# Patient Record
Sex: MALE | Race: WHITE | Employment: FULL TIME | URBAN - METROPOLITAN AREA
[De-identification: names, ages, dates, MRNs, and addresses within clinical notes are randomized per-mention and may not be internally consistent; named-entity substitution may affect disease eponyms.]

---

## 2023-01-03 ENCOUNTER — APPOINTMENT (OUTPATIENT)
Dept: GENERAL RADIOLOGY | Age: 43
End: 2023-01-03
Payer: COMMERCIAL

## 2023-01-03 ENCOUNTER — HOSPITAL ENCOUNTER (EMERGENCY)
Age: 43
Discharge: HOME | End: 2023-01-04
Attending: EMERGENCY MEDICINE
Payer: COMMERCIAL

## 2023-01-03 DIAGNOSIS — D72.829 LEUKOCYTOSIS, UNSPECIFIED TYPE: ICD-10-CM

## 2023-01-03 DIAGNOSIS — R07.9 CHEST PAIN, UNSPECIFIED TYPE: Primary | ICD-10-CM

## 2023-01-03 LAB
ALBUMIN SERPL-MCNC: 4.3 G/DL (ref 3.5–5.2)
ALP BLD-CCNC: 113 U/L (ref 40–129)
ALT SERPL-CCNC: 33 U/L (ref 0–40)
ANION GAP SERPL CALCULATED.3IONS-SCNC: 11 MMOL/L (ref 7–16)
AST SERPL-CCNC: 23 U/L (ref 0–39)
BASOPHILS ABSOLUTE: 0.07 E9/L (ref 0–0.2)
BASOPHILS RELATIVE PERCENT: 0.5 % (ref 0–2)
BILIRUB SERPL-MCNC: 0.4 MG/DL (ref 0–1.2)
BUN BLDV-MCNC: 14 MG/DL (ref 6–20)
CALCIUM SERPL-MCNC: 9.3 MG/DL (ref 8.6–10.2)
CHLORIDE BLD-SCNC: 104 MMOL/L (ref 98–107)
CO2: 24 MMOL/L (ref 22–29)
CREAT SERPL-MCNC: 1 MG/DL (ref 0.7–1.2)
EOSINOPHILS ABSOLUTE: 0.29 E9/L (ref 0.05–0.5)
EOSINOPHILS RELATIVE PERCENT: 1.9 % (ref 0–6)
GFR SERPL CREATININE-BSD FRML MDRD: >60 ML/MIN/1.73
GLUCOSE BLD-MCNC: 95 MG/DL (ref 74–99)
HCT VFR BLD CALC: 48.1 % (ref 37–54)
HEMOGLOBIN: 16 G/DL (ref 12.5–16.5)
IMMATURE GRANULOCYTES #: 0.07 E9/L
IMMATURE GRANULOCYTES %: 0.5 % (ref 0–5)
LIPASE: 28 U/L (ref 13–60)
LYMPHOCYTES ABSOLUTE: 3.13 E9/L (ref 1.5–4)
LYMPHOCYTES RELATIVE PERCENT: 20.4 % (ref 20–42)
MCH RBC QN AUTO: 27.5 PG (ref 26–35)
MCHC RBC AUTO-ENTMCNC: 33.3 % (ref 32–34.5)
MCV RBC AUTO: 82.6 FL (ref 80–99.9)
MONOCYTES ABSOLUTE: 1 E9/L (ref 0.1–0.95)
MONOCYTES RELATIVE PERCENT: 6.5 % (ref 2–12)
NEUTROPHILS ABSOLUTE: 10.79 E9/L (ref 1.8–7.3)
NEUTROPHILS RELATIVE PERCENT: 70.2 % (ref 43–80)
PDW BLD-RTO: 12.7 FL (ref 11.5–15)
PLATELET # BLD: 266 E9/L (ref 130–450)
PMV BLD AUTO: 9.8 FL (ref 7–12)
POTASSIUM SERPL-SCNC: 4.5 MMOL/L (ref 3.5–5)
RBC # BLD: 5.82 E12/L (ref 3.8–5.8)
SODIUM BLD-SCNC: 139 MMOL/L (ref 132–146)
TOTAL PROTEIN: 7.3 G/DL (ref 6.4–8.3)
TROPONIN, HIGH SENSITIVITY: 7 NG/L (ref 0–11)
WBC # BLD: 15.4 E9/L (ref 4.5–11.5)

## 2023-01-03 PROCEDURE — 85025 COMPLETE CBC W/AUTO DIFF WBC: CPT

## 2023-01-03 PROCEDURE — 93005 ELECTROCARDIOGRAM TRACING: CPT | Performed by: NURSE PRACTITIONER

## 2023-01-03 PROCEDURE — 71046 X-RAY EXAM CHEST 2 VIEWS: CPT

## 2023-01-03 PROCEDURE — 84484 ASSAY OF TROPONIN QUANT: CPT

## 2023-01-03 PROCEDURE — 80053 COMPREHEN METABOLIC PANEL: CPT

## 2023-01-03 PROCEDURE — 83690 ASSAY OF LIPASE: CPT

## 2023-01-03 PROCEDURE — 36415 COLL VENOUS BLD VENIPUNCTURE: CPT

## 2023-01-03 PROCEDURE — 99285 EMERGENCY DEPT VISIT HI MDM: CPT

## 2023-01-03 ASSESSMENT — PAIN DESCRIPTION - DESCRIPTORS: DESCRIPTORS: PRESSURE

## 2023-01-03 ASSESSMENT — LIFESTYLE VARIABLES: HOW MANY STANDARD DRINKS CONTAINING ALCOHOL DO YOU HAVE ON A TYPICAL DAY: PATIENT DOES NOT DRINK

## 2023-01-03 ASSESSMENT — PAIN - FUNCTIONAL ASSESSMENT: PAIN_FUNCTIONAL_ASSESSMENT: 0-10

## 2023-01-03 ASSESSMENT — PAIN DESCRIPTION - LOCATION: LOCATION: CHEST

## 2023-01-03 NOTE — Clinical Note
Gladis Dotson was seen and treated in our emergency department on 1/3/2023. He may return to work on 01/06/2023. If you have any questions or concerns, please don't hesitate to call.       Silva Layton, DO

## 2023-01-03 NOTE — LETTER
[address] [facility] Emergency Department  [address] [place] [address]  Jessica Baig [address]  Phone: [phone]               January 4, 2023    Patient: Danuta Etienne   YOB: 1980   Date of Visit: 1/3/2023       To Whom It May Concern:    Marily Sepulveda was seen and treated in our emergency department on 03/03/2023. He can return to work on 01/05/2023.       Sincerely,       Ramonita Reza RN BSN CCRN [facility]        Signature:__________________________________

## 2023-01-04 VITALS
OXYGEN SATURATION: 100 % | SYSTOLIC BLOOD PRESSURE: 131 MMHG | BODY MASS INDEX: 38.5 KG/M2 | HEART RATE: 84 BPM | WEIGHT: 300 LBS | TEMPERATURE: 98.4 F | RESPIRATION RATE: 15 BRPM | DIASTOLIC BLOOD PRESSURE: 78 MMHG | HEIGHT: 74 IN

## 2023-01-04 LAB
EKG ATRIAL RATE: 75 BPM
EKG P AXIS: 26 DEGREES
EKG P-R INTERVAL: 158 MS
EKG Q-T INTERVAL: 386 MS
EKG QRS DURATION: 82 MS
EKG QTC CALCULATION (BAZETT): 431 MS
EKG R AXIS: -6 DEGREES
EKG T AXIS: 15 DEGREES
EKG VENTRICULAR RATE: 75 BPM

## 2023-01-04 PROCEDURE — 6360000002 HC RX W HCPCS

## 2023-01-04 PROCEDURE — 96372 THER/PROPH/DIAG INJ SC/IM: CPT

## 2023-01-04 PROCEDURE — 6370000000 HC RX 637 (ALT 250 FOR IP)

## 2023-01-04 PROCEDURE — 93010 ELECTROCARDIOGRAM REPORT: CPT | Performed by: INTERNAL MEDICINE

## 2023-01-04 RX ORDER — SUCRALFATE 1 G/1
1 TABLET ORAL 4 TIMES DAILY
Qty: 120 TABLET | Refills: 3 | Status: SHIPPED | OUTPATIENT
Start: 2023-01-04

## 2023-01-04 RX ORDER — FAMOTIDINE 20 MG/1
20 TABLET, FILM COATED ORAL 2 TIMES DAILY
Qty: 60 TABLET | Refills: 0 | Status: SHIPPED | OUTPATIENT
Start: 2023-01-04

## 2023-01-04 RX ORDER — KETOROLAC TROMETHAMINE 15 MG/ML
15 INJECTION, SOLUTION INTRAMUSCULAR; INTRAVENOUS ONCE
Status: COMPLETED | OUTPATIENT
Start: 2023-01-04 | End: 2023-01-04

## 2023-01-04 RX ADMIN — KETOROLAC TROMETHAMINE 15 MG: 15 INJECTION, SOLUTION INTRAMUSCULAR; INTRAVENOUS at 01:59

## 2023-01-04 RX ADMIN — LIDOCAINE HYDROCHLORIDE: 20 SOLUTION ORAL; TOPICAL at 00:11

## 2023-01-04 ASSESSMENT — ENCOUNTER SYMPTOMS
VOICE CHANGE: 0
ABDOMINAL PAIN: 0
SHORTNESS OF BREATH: 0
VOMITING: 0
COUGH: 0
PHOTOPHOBIA: 0
DIARRHEA: 0
BACK PAIN: 0
WHEEZING: 0
TROUBLE SWALLOWING: 0
NAUSEA: 0

## 2023-01-04 NOTE — ED NOTES
To [facility] then to [address] [place] [address] to wait for results of tests.       Jose Ranks  01/03/23 2053       Jose Ranks  01/03/23 2054

## 2023-01-04 NOTE — ED PROVIDER NOTES
43y.o. year old male presenting to the emergency room with concerns of chest pain. Reports that symptom's onset 4 days prior. Worsen with none. Improves with burping. Severity of 0/10, with no radiation . Symptoms are constant in timing. Symptoms described as dullness. associated symptoms of burping. Patient in  no acute distress. Patient denies any previous history of hyperlipidemia, hypertension, family history of heart attacks before the age of 54. Patient reports that yearly checkups through his job, however has not seen PCP in some time. Chief Complaint   Patient presents with    Chest Pain     Dul pressure in chest anxiety/ diff swallowing       Review of Systems   Constitutional:  Negative for chills, fatigue and fever. HENT:  Negative for trouble swallowing and voice change. Eyes:  Negative for photophobia and visual disturbance. Respiratory:  Negative for cough, shortness of breath and wheezing. Cardiovascular:  Positive for chest pain. Gastrointestinal:  Negative for abdominal pain, diarrhea, nausea and vomiting. Musculoskeletal:  Negative for arthralgias, back pain, neck pain and neck stiffness. Skin:  Negative for rash and wound. Neurological:  Negative for dizziness, syncope and headaches. Psychiatric/Behavioral:  Negative for behavioral problems and confusion. The patient is nervous/anxious. Physical Exam  Vitals reviewed. Constitutional:       General: He is not in acute distress. Appearance: Normal appearance. HENT:      Head: Normocephalic. Right Ear: External ear normal.      Left Ear: External ear normal.      Nose: Nose normal.      Mouth/Throat:      Mouth: Mucous membranes are moist.   Eyes:      General:         Right eye: No discharge. Left eye: No discharge. Conjunctiva/sclera: Conjunctivae normal.   Cardiovascular:      Rate and Rhythm: Normal rate and regular rhythm. Heart sounds: No friction rub. No gallop.    Pulmonary: Effort: Pulmonary effort is normal. No respiratory distress. Breath sounds: No stridor. No rhonchi or rales. Abdominal:      General: There is no distension. Tenderness: There is no abdominal tenderness. There is no guarding or rebound. Musculoskeletal:         General: No tenderness or deformity. Cervical back: Normal range of motion and neck supple. No rigidity or tenderness. Skin:     General: Skin is warm. Coloration: Skin is not jaundiced. Findings: No erythema. Neurological:      Mental Status: He is alert and oriented to person, place, and time. Sensory: No sensory deficit. Motor: No weakness. Psychiatric:         Mood and Affect: Mood is anxious. Behavior: Behavior normal.        Procedures     XR CHEST (2 VW)   Final Result   No acute cardiopulmonary process. EKG:  This EKG is signed by emergency department physician. Rate: 75  Rhythm: Sinus  AXIS:left    Interpretation: sinus arrhythmia  Comparison: no previous EKG available     MDM  Number of Diagnoses or Management Options  Chest pain, unspecified type  Leukocytosis, unspecified type  Diagnosis management comments: 70-year-old male presented emerged part complaints of chest pain. Lab work as interpreted by medical resident lipase negative no electrolyte abnormality with normal kidney and liver function. Troponin negative. Mild white count elevation. Imaging as interpreted by medical resident. No obvious opacity or pneumothorax noted. Chest x-ray with no acute process noted. Patient reporting pain more discomfort. Previous history of hypertension hyperlipidemia. Reports nearly checkups with work however has not seen PCP for over 2 years. Patient given GI cocktail. Improvement of symptomatology, mild discomfort. Given dose of Toradol IV.   Spoke to patient, discussed today's results through shared decision making we will plan to discharge patient at time with follow-up with PCP.  Patient would like to reestablish with previous PCP and will follow up. Rx for oral Pepcid, oral Carafate given. Return instructions given. ED Course as of 01/04/23 0358 Wed Jan 04, 2023 0154 Spoke to patient, discussed today's results and through shared decision making we will plan to discharge patient at this time with follow-up with PCP. Patient would like to reestablish with previous family doctor. Given return instructions. Rx for oral Carafate, Pepcid given. [SACHI]      ED Course User Index  [SACHI] Dusty Arias DO        ED Course as of 01/04/23 0358 Wed Jan 04, 2023 0154 Spoke to patient, discussed today's results and through shared decision making we will plan to discharge patient at this time with follow-up with PCP. Patient would like to reestablish with previous family doctor. Given return instructions. Rx for oral Carafate, Pepcid given. [SACHI]      ED Course User Index  [SACHI] Dusty Arias DO       --------------------------------------------- PAST HISTORY ---------------------------------------------  Past Medical History:  has no past medical history on file. Past Surgical History:  has no past surgical history on file. Social History:  reports that he has never smoked. He does not have any smokeless tobacco history on file. He reports that he does not drink alcohol and does not use drugs. Family History: family history is not on file. The patients home medications have been reviewed. Allergies: Patient has no known allergies.     -------------------------------------------------- RESULTS -------------------------------------------------  Labs:  Results for orders placed or performed during the hospital encounter of 01/03/23   Troponin   Result Value Ref Range    Troponin, High Sensitivity 7 0 - 11 ng/L   CBC with Auto Differential   Result Value Ref Range    WBC 15.4 (H) 4.5 - 11.5 E9/L    RBC 5.82 (H) 3.80 - 5.80 E12/L    Hemoglobin 16.0 12.5 - 16.5 g/dL Hematocrit 48.1 37.0 - 54.0 %    MCV 82.6 80.0 - 99.9 fL    MCH 27.5 26.0 - 35.0 pg    MCHC 33.3 32.0 - 34.5 %    RDW 12.7 11.5 - 15.0 fL    Platelets 921 708 - 620 E9/L    MPV 9.8 7.0 - 12.0 fL    Neutrophils % 70.2 43.0 - 80.0 %    Immature Granulocytes % 0.5 0.0 - 5.0 %    Lymphocytes % 20.4 20.0 - 42.0 %    Monocytes % 6.5 2.0 - 12.0 %    Eosinophils % 1.9 0.0 - 6.0 %    Basophils % 0.5 0.0 - 2.0 %    Neutrophils Absolute 10.79 (H) 1.80 - 7.30 E9/L    Immature Granulocytes # 0.07 E9/L    Lymphocytes Absolute 3.13 1.50 - 4.00 E9/L    Monocytes Absolute 1.00 (H) 0.10 - 0.95 E9/L    Eosinophils Absolute 0.29 0.05 - 0.50 E9/L    Basophils Absolute 0.07 0.00 - 0.20 E9/L   Comprehensive Metabolic Panel   Result Value Ref Range    Sodium 139 132 - 146 mmol/L    Potassium 4.5 3.5 - 5.0 mmol/L    Chloride 104 98 - 107 mmol/L    CO2 24 22 - 29 mmol/L    Anion Gap 11 7 - 16 mmol/L    Glucose 95 74 - 99 mg/dL    BUN 14 6 - 20 mg/dL    Creatinine 1.0 0.7 - 1.2 mg/dL    Est, Glom Filt Rate >60 >=60 mL/min/1.73    Calcium 9.3 8.6 - 10.2 mg/dL    Total Protein 7.3 6.4 - 8.3 g/dL    Albumin 4.3 3.5 - 5.2 g/dL    Total Bilirubin 0.4 0.0 - 1.2 mg/dL    Alkaline Phosphatase 113 40 - 129 U/L    ALT 33 0 - 40 U/L    AST 23 0 - 39 U/L   Lipase   Result Value Ref Range    Lipase 28 13 - 60 U/L   EKG 12 Lead   Result Value Ref Range    Ventricular Rate 75 BPM    Atrial Rate 75 BPM    P-R Interval 158 ms    QRS Duration 82 ms    Q-T Interval 386 ms    QTc Calculation (Bazett) 431 ms    P Axis 26 degrees    R Axis -6 degrees    T Axis 15 degrees       Radiology:  XR CHEST (2 VW)   Final Result   No acute cardiopulmonary process. ------------------------- NURSING NOTES AND VITALS REVIEWED ---------------------------  Date / Time Roomed:  1/3/2023 11:44 PM  ED Bed Assignment:  SONIA/SONIA    The nursing notes within the ED encounter and vital signs as below have been reviewed.    /78   Pulse 84   Temp 98.4 °F (36.9 °C) Resp 15   Ht 6' 2\" (1.88 m)   Wt 300 lb (136.1 kg)   SpO2 100%   BMI 38.52 kg/m²   Oxygen Saturation Interpretation: Normal      ------------------------------------------ PROGRESS NOTES ------------------------------------------  I have spoken with the patient and discussed todays results, in addition to providing specific details for the plan of care and counseling regarding the diagnosis and prognosis. Their questions are answered at this time and they are agreeable with the plan. I discussed at length with them reasons for immediate return here for re evaluation. They will followup with primary care by calling their office tomorrow. --------------------------------- ADDITIONAL PROVIDER NOTES ---------------------------------  At this time the patient is without objective evidence of an acute process requiring hospitalization or inpatient management. They have remained hemodynamically stable throughout their entire ED visit and are stable for discharge with outpatient follow-up. The plan has been discussed in detail and they are aware of the specific conditions for emergent return, as well as the importance of follow-up. Discharge Medication List as of 1/4/2023  2:05 AM        START taking these medications    Details   famotidine (PEPCID) 20 MG tablet Take 1 tablet by mouth 2 times daily, Disp-60 tablet, R-0Normal      sucralfate (CARAFATE) 1 GM tablet Take 1 tablet by mouth 4 times daily, Disp-120 tablet, R-3Normal             Diagnosis:  1. Chest pain, unspecified type    2. Leukocytosis, unspecified type        Disposition:  Patient's disposition: Discharge to home  Patient's condition is stable. Attending was present and available throughout encounter including all critical portions;  See Attending Note/Attestation for Final Plan       Margaret Burnett DO  Resident  01/04/23 0753   ATTENDING PROVIDER ATTESTATION:     I,  Dr. Leotha Jurist am the primary physician of record for this patient. Sanya Bell presented to the emergency department for evaluation of Chest Pain (Dul pressure in chest anxiety/ diff swallowing)   and was initially evaluated by the Medical Resident. See Original ED Note for H&P and ED course above. I have reviewed and discussed the case, including pertinent history (medical, surgical, family and social) and exam findings with the Medical Resident assigned to Sanya Bell. I have personally performed and/or participated in the history, exam, medical decision making, and procedures and agree with all pertinent clinical information. If an EKG was performed I did review the EKG and did discuss the interpretation with the resident. I do agree with the residents interpretation. I, Dr. Sabrina Patiño am the primary physician of record. Sanya Bell is a 43 y.o. male who presents to the ED for chest pain  Vital signs upon arrival /78   Pulse 84   Temp 98.4 °F (36.9 °C)   Resp 15   Ht 6' 2\" (1.88 m)   Wt 300 lb (136.1 kg)   SpO2 100%   BMI 38.52 kg/m²     History From: The patient as well as the patient's medical record    Independent historian: None    Limitations to history : None    History: The patient is a 29-year-old male presenting emergency department the chief plaint of chest pain. Patient began with chest pain 4 days ago. He has not tried any treatments. Described as dull. He did have some improvement with burping. Physical exam: Reveals patient sitting in the bed in no acute distress. Lungs clear to auscultation bilaterally.   Heart regular rate and rhythm    Differential diagnosis includes but not limited to:  MI-no evidence of ischemic changes on EKG  ACS-unlikely as patient has heart score of 1 for father with MI  Pneumothorax-unremarkable chest x-ray  Pulmonary embolus-unlikely as patient is PERC negative  Pancreatitis-nightly as patient with lipase of 28  Gastritis-likely as patient did have improvement with GI cocktail and does have improvement with burping    Records Reviewed : None    Diagnostic Considerations : Considered CTA of the chest but patient is PERC negative making a CTA of the chest unnecessary. Patient treated with:  P.o. GI cocktail  IM Toradol    Labs reviewed by me demonstrate :  CBC remarkable for mild leukocytosis of 15.4, no obvious source of infection  CMP unremarkable  High-sensitivity troponin 7  Lipase 28    Imaging reviewed and interpreted by me demonstrates:  Chest x-ray with no evidence of pneumothorax or pneumonia    EKG: This EKG is signed and interpreted by me. Normal sinus rhythm rate of 75, no ST segment elevation or depression, ME interval 158 MS, QRS 82 MS,  MS      Discussion with Other Profesionals : None    Social Determinants : None      Disposition:     Pt will be d/c with prescriptions for Pepcid and Carafate and will follow up with his PCP . He is educated on signs and symptoms that require emergent evaluation. Pt is advised to return to the ED if his symptoms change or worsen. If his pain persists, pt may need further evaluation. Pt is agreeable to plan and all questions have been answered at this time.         PATIENT REFERRED TO:  Follow up with your doctor or call the [facility] for a family doctor  Call [phone]  Call       [address] [facility] Emergency Department  [address] [facility] [address] [phone]    If symptoms worsen      DISCHARGE MEDICATIONS:  Discharge Medication List as of 1/4/2023  2:05 AM        START taking these medications    Details   famotidine (PEPCID) 20 MG tablet Take 1 tablet by mouth 2 times daily, Disp-60 tablet, R-0Normal      sucralfate (CARAFATE) 1 GM tablet Take 1 tablet by mouth 4 times daily, Disp-120 tablet, R-3Normal             DISCONTINUED MEDICATIONS:  Discharge Medication List as of 1/4/2023  2:05 AM           I have reviewed my findings and recommendations with the assigned Medical Resident, Shraddha Yao and members of family present at the time of disposition. My findings/plan: The primary encounter diagnosis was Chest pain, unspecified type. A diagnosis of Leukocytosis, unspecified type was also pertinent to this visit.   Discharge Medication List as of 1/4/2023  2:05 AM        START taking these medications    Details   famotidine (PEPCID) 20 MG tablet Take 1 tablet by mouth 2 times daily, Disp-60 tablet, R-0Normal      sucralfate (CARAFATE) 1 GM tablet Take 1 tablet by mouth 4 times daily, Disp-120 tablet, R-3Normal           Massimo Philip, [address] [place] [address],   01/09/23 2014

## 2023-01-04 NOTE — ED NOTES
Department of Emergency Medicine  FIRST PROVIDER TRIAGE NOTE             Independent MLP           1/3/23  7:57 PM EST    Date of Encounter: 1/3/23   MRN: [id number]      HPI: Joseph Stephens is a 43 y.o. male who presents to the ED for Chest Pain (Dul pressure in chest anxiety/ diff swallowing)   Mid sternal chest pain for the past 4 days. Pressure and has been intermittent, denies any recent cough or cold symptoms, denies fever, denies fall    ROS: Negative for abd pain or back pain. PE: Gen Appearance/Constitutional: alert  CV: regular rate     Initial Plan of Care: All treatment areas with department are currently occupied. Plan to order/Initiate the following while awaiting opening in ED: labs, EKG, and imaging studies.   Initiate Treatment-Testing, Proceed toTreatment Area When Bed Available for ED Attending/MLP to Continue Care    Electronically signed by DEVANG Jay CNP   DD: 1/3/23       DEVANG Bess CNP  01/03/23 1958